# Patient Record
Sex: FEMALE | Race: WHITE | NOT HISPANIC OR LATINO | Employment: FULL TIME | URBAN - METROPOLITAN AREA
[De-identification: names, ages, dates, MRNs, and addresses within clinical notes are randomized per-mention and may not be internally consistent; named-entity substitution may affect disease eponyms.]

---

## 2020-02-07 ENCOUNTER — TRANSCRIBE ORDERS (OUTPATIENT)
Dept: PHYSICAL THERAPY | Facility: CLINIC | Age: 40
End: 2020-02-07

## 2020-02-07 DIAGNOSIS — M79.18 MYOFASCIAL PAIN: ICD-10-CM

## 2020-02-07 DIAGNOSIS — M54.50 ACUTE BILATERAL LOW BACK PAIN WITHOUT SCIATICA: Primary | ICD-10-CM

## 2020-02-12 ENCOUNTER — TREATMENT (OUTPATIENT)
Dept: PHYSICAL THERAPY | Facility: CLINIC | Age: 40
End: 2020-02-12

## 2020-02-12 DIAGNOSIS — Z98.890 HISTORY OF BACK SURGERY: ICD-10-CM

## 2020-02-12 DIAGNOSIS — M54.50 ACUTE BILATERAL LOW BACK PAIN WITHOUT SCIATICA: Primary | ICD-10-CM

## 2020-02-12 DIAGNOSIS — M79.18 MYOFASCIAL PAIN: ICD-10-CM

## 2020-02-12 PROCEDURE — 97162 PT EVAL MOD COMPLEX 30 MIN: CPT | Performed by: PHYSICAL THERAPIST

## 2020-02-12 PROCEDURE — 97110 THERAPEUTIC EXERCISES: CPT | Performed by: PHYSICAL THERAPIST

## 2020-02-12 NOTE — PROGRESS NOTES
Physical Therapy Initial Evaluation and Plan of Care      Patient: Nessa Easley   : 1980  Diagnosis/ICD-10 Code:  Acute bilateral low back pain without sciatica [M54.5]  Referring practitioner: ALEX Berman  Date of Initial Visit: 2020  Today's Date: 2020  Patient seen for 1 sessions           Subjective Evaluation    History of Present Illness  Date of onset: 2020  Mechanism of injury: Pt started with back pain at the beginning of the year. Reports a history of scoliosis and Aguirre rods placed at age 16. She had a lumbar discectomy about age 25 and then about 5 years ago had a lumbar laminecetomy (she thinks L5/S1). Since , her pain has increased. It started out as achy and now it is just more pressure, chronically. She has no radicular symptoms.  Having MRI tomorrow night at [facility]. She has a 3 year old child and likes being active.       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  Aggravating factors: ambulation, standing, sleeping and lifting    Social Support  Lives in: multiple-level home  Lives with: spouse and young children    Treatments  Current treatment: medication  Patient Goals  Patient goals for therapy: decreased pain and return to sport/leisure activities  Patient goal: walking, taking child to zoo           Objective       Static Posture     Comments  Level iliac crest, flattened spine due to Aguirre rods    Palpation   Left   Hypertonic in the erector spinae, lumbar paraspinals and quadratus lumborum.   Tenderness of the erector spinae, lumbar paraspinals and quadratus lumborum.     Right   Hypertonic in the erector spinae, lumbar paraspinals and quadratus lumborum. Tenderness of the erector spinae, lumbar paraspinals and quadratus lumborum.      Tenderness     Left Hip   Tenderness in the sacroiliac joint.     Right Hip   Tenderness in the sacroiliac joint.     Neurological Testing     Sensation     Lumbar   Left   Intact: light touch    Right   Intact: light touch    Active Range of Motion     Additional Active Range of Motion Details  Lumbar AROM about 25% and pain with all motions across the L/S junction    Strength/Myotome Testing     Left Hip   Planes of Motion   Flexion: 4-  Abduction: 4  Adduction: 4+  External rotation: 4+    Right Hip   Planes of Motion   Flexion: 4  Abduction: 4  Adduction: 4+  External rotation: 4+    Left Knee   Flexion: 4  Extension: 4    Right Knee   Flexion: 4  Extension: 4    Left Ankle/Foot   Dorsiflexion: 4+  Plantar flexion: 4+    Right Ankle/Foot   Dorsiflexion: 4+  Plantar flexion: 4+    Muscle Activation     Additional Muscle Activation Details  Able to contract transverse abs  1.5-2 finger diastasis rectus    Tests       Thoracic   Negative slump.     Lumbar     Left   Positive quadrant.   Negative passive SLR.     Right   Positive quadrant.   Negative passive SLR.     Left Pelvic Girdle/Sacrum   Positive: sacrum compression and sacral spring.     Right Pelvic Girdle/Sacrum   Positive: sacrum compression and sacral spring.     Left Hip   Positive BLANCHE.     Right Hip   Positive BLANCHE.     Additional Tests Details  Pain in back         See Exercise, Manual, and Modality Logs for complete treatment.     Functional outcome score: Oswestry Back index=64%      Education and Exercise:  Worked on abdominal bracing, TA activation, and breathing      Assessment & Plan     Assessment  Impairments: abnormal gait, impaired balance, impaired physical strength, lacks appropriate home exercise program and pain with function  Assessment details: Nessa Easley is a 39 y.o. year-old female referred to physical therapy for low back pain. She presents with a evolving clinical presentation.  She has comorbidities of 3 previous back  surgeries including placement of Aguirre rods, discectomy, and laminectomy and personal factors of being a mom to a 3 year old toddler that may affect her progress in the plan of care.  She has decreased lumbar AROM and pain in the lower lumbar with all movements. She has decreased core and LE strength, decreased flexibility of the hips/LE, and tenderness and tightness of her QL and lumbar PVMs. She is (-) for neural tension tests.  Pt would benefit from therapy to help improve her ability to walk, stand, lift, and care for her child.    Prognosis: good  Functional Limitations: carrying objects, lifting, walking, pushing and standing  Goals  Plan Goals: ST wks  1. Patient will be independent with education for symptom management, joint protection and strategies to minimize stress on affected tissues  2. Pt is able to tolerate 30 min of aquatic therapy with reports of reduced pain levels after treatment  3. Pt able to walk 10 min in the water without increased pain and with good posture    LT wks  1. Pt to report no more than /10 pain in the LB with ADLs  2. Pt to improve trunk and LE strength by 1/2 to 1 MMT grade  3. Pt able to exit the pool on the stairs using a reciprocal pattern  4. Pt to improve Oswestry Back index score from % to  % for overall functional improvement  5. Pt to be independent with progressive HEP for core and LE strength      Plan  Therapy options: will be seen for skilled physical therapy services  Planned modality interventions: thermotherapy (hydrocollator packs), TENS, ultrasound and cryotherapy  Planned therapy interventions: abdominal trunk stabilization, ADL retraining, balance/weight-bearing training, body mechanics training, flexibility, functional ROM exercises, gait training, home exercise program, IADL retraining, joint mobilization, manual therapy, postural training, soft tissue mobilization, spinal/joint mobilization, strengthening, stretching, therapeutic activities  and transfer training  Frequency: 2x week  Duration in weeks: 20  Treatment plan discussed with: patient        Timed:  Manual Therapy:         mins  16006;  Therapeutic Exercise:    8     mins  17091;     Neuromuscular Manuel:        mins  29666;    Therapeutic Activity:          mins  75237;     Gait Training:           mins  34160;     Ultrasound:          mins  59857;    Electrical Stimulation:         mins  75983 ( );  Iontophoresis         mins 44035  Dry Needling        mins      Untimed:  Electrical Stimulation:         mins  63574 ( );  Mechanical Traction:         mins  27305;     Timed Treatment:   8   mins   Total Treatment:     45   mins    PT SIGNATURE: Luma Renee, PT   DATE TREATMENT INITIATED: 2/12/2020    Initial Certification  Certification Period: 5/12/2020  I certify that the therapy services are furnished while this patient is under my care.  The services outlined above are required by this patient, and will be reviewed every 90 days.     PHYSICIAN: Marga Burris APRN      DATE:     Please sign and return via fax to  .. Thank you, [facility].

## 2020-02-18 ENCOUNTER — TREATMENT (OUTPATIENT)
Dept: PHYSICAL THERAPY | Facility: CLINIC | Age: 40
End: 2020-02-18

## 2020-02-18 DIAGNOSIS — M54.50 ACUTE BILATERAL LOW BACK PAIN WITHOUT SCIATICA: Primary | ICD-10-CM

## 2020-02-18 DIAGNOSIS — M79.18 MYOFASCIAL PAIN: ICD-10-CM

## 2020-02-18 DIAGNOSIS — Z98.890 HISTORY OF BACK SURGERY: ICD-10-CM

## 2020-02-18 PROCEDURE — PTSPMIN2 PR PHYS THER SP 16 TO 30 MINUTES: Performed by: PHYSICAL THERAPIST

## 2020-02-18 NOTE — PROGRESS NOTES
"Physical Therapy Daily Progress Note    Patient: Nessa Easley   : 1980  Diagnosis/ICD-10 Code:  Acute bilateral low back pain without sciatica [M54.5]  Referring practitioner: ALEX Berman  Date of Initial Visit:   Type: THERAPY  Noted: 2020  Today's Date: 2020  Patient seen for 2 sessions             Subjective   Constant pain progressively worsening over last 6 weeks or so - waiting for MRI results.  Pain worse with standing/walking and changing positions.    Objective     AQUATICS LE    Water Walk  fwd only   Stretch 1  HS 30 sec x 2   Stretch 2  piriformis 30 sec x 2  Stretch 3  short lever hip sweeps x 10 w/ SN under distal thigh.   Stretch Other 1 trial of wall stretch alternating knees flexed and extended x 3  Stretch Other 2 -  Vertical Traction 2-3 min at a time intermittently t/o session.  Abdominals   LN x 12  Clams   -  Hip Abd/Add  x 12  Hip Flex/Ext  -  March in Place x 15  Mini Squat  x 12  Toe/Heel Raises -  Uni-Squat  -  Uni-Clock  hip circles cw/ccw, 10/10  Step Ups  -  Bicycle  seated x 3 min  Flutter/Scissor 15/15, seaed  Exercise 1  -  Exercise 2  -  Exercise 3  -      Assessment/Plan  Patient seen today for initial aquatic therapy session including education and instruction in basic aquatic exercises.  Unable to get to desired depth in pool 2/2 large class occupying deep end of pool so most ex performed ~ 3'9\" depth.  Frequent decompression breaks every 2-3 ex throughout session but only provided momentary benefit.  Cuing provided for optimal posture, core/glut activation, and correct form/technique with ex.  Ended session at jets in hot pool for muscle relaxation post ex.     Plan:  Assess response to initial aquatic session and modify/progress as appropriate/tolerated.    Progress per Plan of Care and Progress strengthening /stabilization /functional activity           Timed:  Aquatic Therapy    40     mins [id number];    Krysta Koch PT  Physical " Therapist

## 2020-02-20 ENCOUNTER — TREATMENT (OUTPATIENT)
Dept: PHYSICAL THERAPY | Facility: CLINIC | Age: 40
End: 2020-02-20

## 2020-02-20 DIAGNOSIS — Z98.890 HISTORY OF BACK SURGERY: ICD-10-CM

## 2020-02-20 DIAGNOSIS — M79.18 MYOFASCIAL PAIN: ICD-10-CM

## 2020-02-20 DIAGNOSIS — M54.50 ACUTE BILATERAL LOW BACK PAIN WITHOUT SCIATICA: Primary | ICD-10-CM

## 2020-02-20 PROCEDURE — 97113 AQUATIC THERAPY/EXERCISES: CPT | Performed by: PHYSICAL THERAPIST

## 2020-02-20 NOTE — PROGRESS NOTES
Physical Therapy Daily Progress Note    Patient: Nessa Easley   : 1980  Diagnosis/ICD-10 Code:  Acute bilateral low back pain without sciatica [M54.5]  Referring practitioner: ALEX Berman  Date of Initial Visit:   Type: THERAPY  Noted: 2020  Today's Date: 2020  Patient seen for 3 sessions             Subjective   Got MRI results and they think it’s my facet joints below my surgery so they are going to schedule injections.  Felt better and looser for a little while after last time but didn’t last very long.      Objective     AQUATICS LE    Water Walk  fwd only x 5 min  Stretch 1  HS 30 sec x 2  Stretch 2  piriformis 30 sec.  2  Stretch 3  hsort lever hip sweeps x 10 w/ SN under distal thigh  Stretch Other 1 wall stretch with extended knees 30 sec x 2  Stretch Other 2 -  Vertical Traction 2 x 2-3 min (mid session and again at end of session)  Abdominals   LN x 12  Clams   suspended x 12  Hip Abd/Add  x 12  Hip Flex/Ext  x 15  March in Place x 15  Mini Squat  x 12  Toe/Heel Raises -  Uni-Squat  -  Uni-Clock  hip circles cw/ccw, 10/10  Step Ups  -  Bicycle  seated x 3 min  Flutter/Scissor 15/15, seated  Exercise 1  suspended tuck ups x 10  Exercise 2  -  Exercise 3  -      Assessment/Plan  Patient noted temporary benefit following initial aquatic session and hopeful this will help.  Continued with aquatic ex as previous with less need for decompression breaks (only required 1 mid session and 1 at end of session) today.  Switched to suspended flutter kick and cycles this visit.  Also added suspended clams and tuck ups without issue during session.      Plan:  Continue aquatic therapy for decompression with ex/activity advancing as patient tolerates.   Consider adding UE/core ex next visit.  Issue list of ex patient can try on her own to begin working toward transition to independent aquatic ex as she has joined facility.      Progress per Plan of Care and Progress strengthening  /stabilization /functional activity           Timed:  Aquatic Therapy    34     mins [id number];    Krysta Koch, PT  Physical Therapist

## 2020-02-26 ENCOUNTER — TREATMENT (OUTPATIENT)
Dept: PHYSICAL THERAPY | Facility: CLINIC | Age: 40
End: 2020-02-26

## 2020-02-26 DIAGNOSIS — M54.50 ACUTE BILATERAL LOW BACK PAIN WITHOUT SCIATICA: Primary | ICD-10-CM

## 2020-02-26 DIAGNOSIS — Z98.890 HISTORY OF BACK SURGERY: ICD-10-CM

## 2020-02-26 DIAGNOSIS — M79.18 MYOFASCIAL PAIN: ICD-10-CM

## 2020-02-26 PROCEDURE — PTSPMIN2 PR PHYS THER SP 16 TO 30 MINUTES: Performed by: PHYSICAL THERAPIST

## 2020-02-26 NOTE — PROGRESS NOTES
Physical Therapy Daily Progress Note    Patient: Nessa Easley   : 1980  Diagnosis/ICD-10 Code:  Acute bilateral low back pain without sciatica [M54.5]  Referring practitioner: ALEX Berman  Date of Initial Visit: Type: THERAPY  Noted: 2020  Today's Date: 2020  Patient seen for 4 sessions             Subjective   Water feels good but I just feel like my pain is getting worse.      Objective     AQUATICS LE     Water Walk              fwd only x 5 min  Stretch 1                   HS 30 sec x 2  Stretch 2                   piriformis 30 sec.  2  Stretch 3                   short lever hip sweeps x 10 w/ SN under distal thigh  Stretch Other 1        wall stretch with extended knees 30 sec x 2  Stretch Other 2        -  Vertical Traction      1-2 min decompression breaks x 4 t/o session and on own post ex  Abdominals              LN x 12  Clams                       suspended x 12  Hip Abd/Add             x 12  Hip Flex/Ext              x 15  March in Place         x 15  Mini Squat                x 12  Toe/Heel Raises      -  Uni-Squat                 -  Uni-Clock                 hip circles cw/ccw, 10/10  Step Ups                  -  Bicycle                      seated x 3 min  Flutter/Scissor          15/15, seated  Exercise 1                suspended tuck ups x 3 - stopped 2/2 to increased pain today  Exercise 2                L1 paddles:  Rows B and alt x 10 ea   Exercise 3                -    Assessment/Plan  Patient feels better while in the water but not noticing any lasting benefit thus far.  She indicated lateral movements feeling better than flexion this visit so modified ex as necessary.  Added trial of UE/core ex with light resistance and continued with intermittent decompression breaks (more frequent this session).      Plan:  Continue aquatic therapy for decompression with ex/activity and begin working towards transition to independent self management.    Progress per Plan of  Care and Progress strengthening /stabilization /functional activity           Timed:  Aquatic Therapy    35     mins [id number];    Krysta Koch, PT  Physical Therapist

## 2020-03-04 ENCOUNTER — TREATMENT (OUTPATIENT)
Dept: PHYSICAL THERAPY | Facility: CLINIC | Age: 40
End: 2020-03-04

## 2020-03-04 DIAGNOSIS — M79.18 MYOFASCIAL PAIN: ICD-10-CM

## 2020-03-04 DIAGNOSIS — Z98.890 HISTORY OF BACK SURGERY: ICD-10-CM

## 2020-03-04 DIAGNOSIS — M54.50 ACUTE BILATERAL LOW BACK PAIN WITHOUT SCIATICA: Primary | ICD-10-CM

## 2020-03-04 PROCEDURE — PTSPMIN2 PR PHYS THER SP 16 TO 30 MINUTES: Performed by: PHYSICAL THERAPIST

## 2020-03-04 NOTE — PROGRESS NOTES
Physical Therapy Daily Progress Note    Patient: Nessa Easley   : 1980  Diagnosis/ICD-10 Code:  Acute bilateral low back pain without sciatica [M54.5]  Referring practitioner: ALEX Berman  Date of Initial Visit:   Type: THERAPY  Noted: 2020  Today's Date: 3/4/2020  Patient seen for 5 sessions             Subjective   Pain about usual - 10.  Did come and do about 30 min in the water on my own over the weekend.     Objective     AQUATICS LE    Water Walk  fwd x 5 min  Stretch 1  HS 30 sec x 2  Stretch 2  piriformis 30 sec x 2  Stretch 3  short lever hip sweeps x 10 (SN under flexed knee)   Stretch Other 1 wall stretch w/ extended knees 30 sec x 2  Stretch Other 2 -  Vertical Traction 1-2 min decompression breaks prn   Abdominals   LN x 12  Clams   suspended x 12  Hip Abd/Add  x 12  Hip Flex/Ext  OMITTED - x 15  March in Place x 15  Mini Squat  x 12  Toe/Heel Raises -  Uni-Squat  -  Uni-Clock  hip circles cw/ccw, 10/10  Step Ups  -  Bicycle  suspended x 3 min  Flutter/Scissor - / 15, suspended   Exercise 1  suspended tuck ups x 10  Exercise 2  L1 paddles:  rows B and alt x 10-12 ea  Exercise 3  -      Assessment/Plan  Patient notes water feels good and she is able to move easier in the water but hasn’t noticed any real change in overall symptoms.  She has joined facility and came over the weekend and did about 30 min of aquatic ex/activity on her own without symptom exacerbation.  Continued with most previous aquatic exercises and decompression breaks prn.  She exhibited facial grimacing on a few ex and required instruction to modify ex (by moving shallower, deeper, limiting ROM) or stop if causing increased pain.  Patient did end session seated at jets in hot pool for relaxation.      Plan:  Continue with aquatic therapy for decompression with ex/ activity working toward transition to independent aquatic program for self management     Progress per Plan of Care and Progress strengthening  /stabilization /functional activity           Timed:  Aquatic Therapy    36     mins [id number];    Krysta Koch, PT  Physical Therapist

## 2020-03-11 ENCOUNTER — TREATMENT (OUTPATIENT)
Dept: PHYSICAL THERAPY | Facility: CLINIC | Age: 40
End: 2020-03-11

## 2020-03-11 DIAGNOSIS — Z98.890 HISTORY OF BACK SURGERY: ICD-10-CM

## 2020-03-11 DIAGNOSIS — M54.50 ACUTE BILATERAL LOW BACK PAIN WITHOUT SCIATICA: Primary | ICD-10-CM

## 2020-03-11 DIAGNOSIS — M79.18 MYOFASCIAL PAIN: ICD-10-CM

## 2020-03-11 PROCEDURE — PTSPMIN2 PR PHYS THER SP 16 TO 30 MINUTES: Performed by: PHYSICAL THERAPIST

## 2020-03-11 NOTE — PROGRESS NOTES
Physical Therapy Daily Progress Note    Patient: Nessa Easley   : 1980  Diagnosis/ICD-10 Code:  Acute bilateral low back pain without sciatica [M54.5]  Referring practitioner: ALEX Berman  Date of Initial Visit:   Type: THERAPY  Noted: 2020  Today's Date: 3/11/2020  Patient seen for 6 sessions             Subjective   Had injections and they definitely have helped.  Still can feel it but not as bad with everyday activities.  I came on Monday and did a little too much  - probably because I was feeling better.  Have next set of injections on  and pain management f/u on .    Objective     AQUATICS LE    Water Walk  INDEPENDENT  Stretch 1  HS 30 sec x 2  Stretch 2  piriformis 30 sec x 2  Stretch 3  short lever hip sweeps x 12, SN under flexed knee  Stretch Other 1 wall stretch w/ extended knees 30 sec x 2  Stretch Other 2 -  Vertical Traction 1-2 min decompression breaks prn  Abdominals   LN x 15  Clams   suspended x 15  Hip Abd/Add  x 15  Hip Flex/Ext  -  March in Place x 15, also march walking x 2 laps  Mini Squat  -  Toe/Heel Raises -  Uni-Squat  -  Uni-Clock  hip circles cw/ccw, 10/10  Step Ups  -  Bicycle  suspended x 3 min  Flutter/Scissor - / 15, suspended   Exercise 1  suspended tuck ups x 12  Exercise 2  L1 paddles:  B and alt rows x 12 ea, stirs cw/ccw 10/10  Exercise 3  Alt UE flex/ext and horizontal abd x 10 ea      Assessment/Plan  Patient notes benefit from recent injections.  Performed aquatic exercises as previous increasing reps on some and tried march walking, paddle stirs cw/ccw, alt UE flex/ext, and UE horizontal abduction this session.  Cuing provided to continue to modify ex as necessary even though she is feeling better to reduce risk for flare up.    Plan:  Continue 1 additional visit to finalize aquatic HEP then transition to independent self management.     Anticipate DC next Visit           Timed:  Aquatic Therapy    36     mins [id number];    Krysta Koch,  PT  Physical Therapist

## 2020-03-18 ENCOUNTER — TREATMENT (OUTPATIENT)
Dept: PHYSICAL THERAPY | Facility: CLINIC | Age: 40
End: 2020-03-18

## 2020-03-18 DIAGNOSIS — M54.50 ACUTE BILATERAL LOW BACK PAIN WITHOUT SCIATICA: Primary | ICD-10-CM

## 2020-03-18 DIAGNOSIS — M79.18 MYOFASCIAL PAIN: ICD-10-CM

## 2020-03-18 DIAGNOSIS — Z98.890 HISTORY OF BACK SURGERY: ICD-10-CM

## 2020-03-18 PROCEDURE — PTSPMIN2 PR PHYS THER SP 16 TO 30 MINUTES: Performed by: PHYSICAL THERAPIST

## 2020-03-18 NOTE — PROGRESS NOTES
Physical Therapy Daily Progress Note / Recertification     Patient: Nessa Easley   : 1980  Diagnosis/ICD-10 Code:  Acute bilateral low back pain without sciatica [M54.5]  Referring practitioner: ALEX Berman  Date of Initial Visit:   Type: THERAPY  Noted: 2020  Today's Date: 3/18/2020  Patient seen for 7 sessions             Subjective   Pain about usual.  I was sore in my back and my abs after last session - first time I’ve been sore from ex in a while.      Objective     AQUATICS LE     Water Walk              INDEPENDENT fwd/side, trial of bwd walking   Stretch 1                   HS 30 sec x 2  Stretch 2                   piriformis 30 sec x 2  Stretch 3                   short lever hip sweeps x 12, SN under flexed knee  Stretch Other 1        wall stretch w/ extended knees 30 sec x 2  Stretch Other 2        -  Vertical Traction       1-2 min decompression breaks prn  Abdominals              LN x 15  Clams                       suspended x 15  Hip Abd/Add             x 15  Hip Flex/Ext              hip flex x 10, slight hip ext w/ glut squeeze x 10   March in Place         x 15, OMITTED - march walking x 2 laps  Mini Squat                -  Toe/Heel Raises      -  Uni-Squat                 -  Uni-Clock                 hip circles cw/ccw, 10/10  Step Ups                  -  Bicycle                      suspended x 3 min  Flutter/Scissor          - / 15, suspended   Exercise 1                suspended tuck ups x 12  Exercise 2                L1 paddles:  B and alt rows x 12 ea, stirs cw/ccw 10/10  Exercise 3                Alt UE flex/ext and horizontal abd x 10 ea      Assessment & Plan     Assessment  Impairments: abnormal gait, impaired balance, impaired physical strength, lacks appropriate home exercise program and pain with function  Assessment details: Patient performed aquatic ex as previous and added trial of bwd walking, hip flex, and hip ext this visit.  Began use of aquatic ex  printout as guide to help facilitate transition to independent self management over the next few weeks.  Nessa notes overall improvement in pain since starting therapy and getting recent injections.  She has been able to gradually progress with aquatic ex/activiy with little to no increased discomfort and does not require decompression breaks as frequently during therapy session.  She notes improvement in hip/LE mobility/flexibility and less muscle tightness.  She has Met/Partially Met her STGs and 2 of 5 LTGs with remaining LTGs ongoing.   She will continue to benefit from skilled therapy to help reduce pain, improve functional abilities/activity tolerance, and facilitate transition to an independent aquatic program.       Prognosis: good  Functional Limitations: carrying objects, lifting, walking, pushing and standing  Goals  Plan Goals: ST wks  1. Patient will be independent with education for symptom management, joint protection and strategies to minimize stress on affected tissues (MET)   2. Pt is able to tolerate 30 min of aquatic therapy with reports of reduced pain levels after treatment (Partially Met) - able to tolerate 30-40 min of aquatic therapy but hasn't noticed any immediate reduction in pain afterwards  3. Pt able to walk 10 min in the water without increased pain and with good posture (Partially Met)  - Able to walk 6-8 minutes in the pool without change in pain    LT wks  1. Pt to report no more than 5/10 pain in the LB with ADLs (Partially Met) - She self limits ADLs by taking a break when her pain gets to 4-5/10.  2. Pt to improve trunk and LE strength by 1/2 to 1 MMT grade (Ongoing) - Progressing with strengthening in aquatic environment including recent addition of light resistance  3. Pt able to exit the pool on the stairs using a reciprocal pattern - (MET) - Holds railing but able to do without rail support.    4. Pt to improve Oswestry Back index score from 64% to 44% for overall  functional improvement - (Ongoing) - LESLI completed today = 52%  5. Pt to be independent with progressive HEP for core and LE strength (Ongoing) - gradually progressing aquatic ex/activity.       Plan  Therapy options: will be seen for skilled physical therapy services  Planned modality interventions: thermotherapy (hydrocollator packs), TENS, ultrasound and cryotherapy  Planned therapy interventions: abdominal trunk stabilization, ADL retraining, balance/weight-bearing training, body mechanics training, flexibility, functional ROM exercises, gait training, home exercise program, IADL retraining, joint mobilization, manual therapy, postural training, soft tissue mobilization, spinal/joint mobilization, strengthening, stretching, therapeutic activities and transfer training  Other planned therapy interventions: aquatic therapy  Frequency: 2x week  Duration in weeks: 16  Treatment plan discussed with: patient      Plan:  Continue aquatic therapy working on mobility, flexibility, and strength/stabilization progressing as patient tolerates.      Progress per Plan of Care and Progress strengthening /stabilization /functional activity           Timed:  Aquatic Therapy    35  mins [id number];    Krysta Koch, PT  Physical Therapist

## 2020-12-01 ENCOUNTER — DOCUMENTATION (OUTPATIENT)
Dept: PHYSICAL THERAPY | Facility: CLINIC | Age: 40
End: 2020-12-01

## 2020-12-01 DIAGNOSIS — M54.50 ACUTE BILATERAL LOW BACK PAIN WITHOUT SCIATICA: Primary | ICD-10-CM

## 2020-12-01 DIAGNOSIS — M79.18 MYOFASCIAL PAIN: ICD-10-CM

## 2020-12-01 DIAGNOSIS — Z98.890 HISTORY OF BACK SURGERY: ICD-10-CM
